# Patient Record
Sex: FEMALE | Race: WHITE | NOT HISPANIC OR LATINO | Employment: FULL TIME | URBAN - METROPOLITAN AREA
[De-identification: names, ages, dates, MRNs, and addresses within clinical notes are randomized per-mention and may not be internally consistent; named-entity substitution may affect disease eponyms.]

---

## 2024-11-28 ENCOUNTER — OFFICE VISIT (OUTPATIENT)
Dept: URGENT CARE | Facility: CLINIC | Age: 49
End: 2024-11-28
Payer: COMMERCIAL

## 2024-11-28 VITALS
OXYGEN SATURATION: 95 % | DIASTOLIC BLOOD PRESSURE: 84 MMHG | SYSTOLIC BLOOD PRESSURE: 132 MMHG | TEMPERATURE: 97 F | HEIGHT: 66 IN | RESPIRATION RATE: 18 BRPM | HEART RATE: 97 BPM | BODY MASS INDEX: 33.11 KG/M2 | WEIGHT: 206 LBS

## 2024-11-28 DIAGNOSIS — S61.411A LACERATION OF RIGHT HAND WITHOUT FOREIGN BODY, INITIAL ENCOUNTER: ICD-10-CM

## 2024-11-28 RX ORDER — ESTROGEN,CON/M-PROGEST ACET 0.625-5 MG
TABLET ORAL
COMMUNITY

## 2024-11-28 RX ORDER — FLUTICASONE PROPIONATE AND SALMETEROL 100; 50 UG/1; UG/1
1 POWDER RESPIRATORY (INHALATION) EVERY 12 HOURS
COMMUNITY
Start: 2024-10-23

## 2024-11-28 ASSESSMENT — FIBROSIS 4 INDEX: FIB4 SCORE: 0.81

## 2024-11-28 ASSESSMENT — ENCOUNTER SYMPTOMS: ROS SKIN COMMENTS: LACERATION RIGHT HAND

## 2024-11-28 NOTE — PROGRESS NOTES
"Subjective:   Grazyna Shelton is a 49 y.o. female who presents for Laceration (Xtoday, right hand laceration)      HPI  Patient was out hiking today when she slipped on ice, caught herself with her right hand and lacerated her right palm.  Patient states she is up to date on her tetanus.       Review of Systems   Skin:         Laceration right hand   All other systems reviewed and are negative.      Medical History:  No past medical history on file.    Allergies:  Allergies   Allergen Reactions    Ocrelizumab Unspecified     Coughing, itching    c/o coughing, itching in the back of her throat during infusion, benadryl and hydrocortisone given, symptoms resolved and remainder infusion completed without further complication.    Other Drug Unspecified     Reactions to: grasses and trees    Piperacillin Sod-Tazobactam So      Increased lft       Social history, surgical history, medications, and current problem list reviewed today in Epic.       Objective:     /84 (BP Location: Left arm, Patient Position: Sitting, BP Cuff Size: Adult)   Pulse 97   Temp 36.1 °C (97 °F) (Temporal)   Resp 18   Ht 1.676 m (5' 6\")   Wt 93.4 kg (206 lb)   SpO2 95%     No signs of redness, warmth, swelling to wound. Bleeding stopped prior to arrival.   Verbal Consent was obtained. Risks and alternatives were discussed including pain, scar, nerve damage, tendon damage, and possibility of retained foreign body. Alternatives discussed as well and it is elected by patient to move forward with sutures.  Patient was positioned appropriately. 1% lidocaine WITHOUT epinephrine was used as a local anesthetic. Copious amount of saline was used for irrigation under syringe pressure with a shield.  Hibicleanse used for wound cleaning. The wound was explored through the full range of motion and no visible FB and/or tendon injury was identified. The skin was prepped aseptically. The wound was draped in a sterile fashion and the wound was " exposed.      Laceration location: right palm  Laceration length in cm: triangle flap 3 cm x 2 cm  The SUTURE material was nylon  The SUTURE size was 5.0  The number of SUTURES placed was 5  Lidocaine used: 5 mL  Time spent at bedside performing procedure: 40          Physical Exam  Vitals reviewed.   Constitutional:       General: She is not in acute distress.     Appearance: Normal appearance. She is not ill-appearing, toxic-appearing or diaphoretic.   Cardiovascular:      Rate and Rhythm: Normal rate.      Pulses: Normal pulses.   Pulmonary:      Effort: Pulmonary effort is normal.   Musculoskeletal:         General: Normal range of motion.      Right hand: Laceration present. Normal range of motion. Normal strength. Normal sensation. There is no disruption of two-point discrimination. Normal capillary refill. Normal pulse.      Left hand: Normal.        Arms:       Cervical back: Normal range of motion.   Skin:     General: Skin is warm.      Capillary Refill: Capillary refill takes less than 2 seconds.   Neurological:      General: No focal deficit present.      Mental Status: She is alert and oriented to person, place, and time.   Psychiatric:         Mood and Affect: Mood normal.         Behavior: Behavior normal.         Assessment/Plan:       Diagnosis and associated orders:     1. Laceration of right hand without foreign body, initial encounter    Other orders  - PREMPRO 0.625-5 MG per tablet; TAKE ONE TAB BY MOUTH DAILY INDICATIONS: SYMPTOMS OF MENOPAUSE  - ocrelizumab (OCREVUS) 300 MG/10ML Solution injection  - fluticasone-salmeterol (ADVAIR) 100-50 MCG/ACT AEROSOL POWDER, BREATH ACTIVATED; Inhale 1 Puff every 12 hours.     Comments/MDM:   I personally reviewed prior external notes and test results pertinent to today's visit as well as additional imaging and testing completed in clinic today.     Very pleasant 49-year-old afebrile, hemodynamically stable, well-appearing female presenting with laceration  to right palm.  Simple sutures were placed with good approximation.  Procedure note above.  Procedure tolerated without complications. Wound dressed with sterile gauze. Tetanus status up to date according to patient. The wound and care plan, including medications and self-management goals, were reviewed to the best of the patients ability. All questions and concerns were answered.  Recommend wound recheck in 2 days with PCP and sutures out in 10 days.  Patient verbalized understanding of the plan of care.        Patient is clinically stable at today's acute urgent care visit. Vital signs are normal and reassuring.  No acute distress noted. Appropriate for outpatient management at this time. No red flag warnings noted.  Patient given strict instructions to follow up with emergency room if they develop any red flag warnings which were discussed in depth.  They verbalized understanding.      Differential diagnosis, natural history, supportive care, and indications for immediate follow-up discussed. All questions answered. Patient agrees with the plan of care. Advised the patient to follow-up with the primary care physician for recheck, reevaluation, and consideration of further management or the emergency room for worsening symptoms.      I have spent 45 minutes on the care of this patient.  This includes preparing for the urgent care visit. This time includes review of previous visits/ documents, obtaining HPI, examination and evaluation of patient, ordering and interpretation of labs, imaging, tests, medical management, counseling, education and documentation, procedures.       Please note that this dictation was created using voice recognition software. I have made every reasonable attempt to correct obvious errors, but I expect that there are errors of grammar and possibly content that I did not discover before finalizing the note.